# Patient Record
Sex: FEMALE | Race: BLACK OR AFRICAN AMERICAN | NOT HISPANIC OR LATINO | URBAN - METROPOLITAN AREA
[De-identification: names, ages, dates, MRNs, and addresses within clinical notes are randomized per-mention and may not be internally consistent; named-entity substitution may affect disease eponyms.]

---

## 2021-08-28 ENCOUNTER — TELEPHONE ENCOUNTER (OUTPATIENT)
Dept: URBAN - METROPOLITAN AREA CLINIC 13 | Facility: CLINIC | Age: 71
End: 2021-08-28

## 2021-08-29 ENCOUNTER — TELEPHONE ENCOUNTER (OUTPATIENT)
Dept: URBAN - METROPOLITAN AREA CLINIC 13 | Facility: CLINIC | Age: 71
End: 2021-08-29

## 2022-04-30 ENCOUNTER — TELEPHONE ENCOUNTER (OUTPATIENT)
Dept: URBAN - METROPOLITAN AREA CLINIC 121 | Facility: CLINIC | Age: 72
End: 2022-04-30

## 2022-05-01 ENCOUNTER — TELEPHONE ENCOUNTER (OUTPATIENT)
Dept: URBAN - METROPOLITAN AREA CLINIC 121 | Facility: CLINIC | Age: 72
End: 2022-05-01

## 2022-05-01 RX ORDER — CHLORPHENIRAMINE MALEATE, IBUPROFEN, AND PHENYLEPHRINE HYDROCHLORIDE 4; 200; 10 MG/1; MG/1; MG/1
TABLET, COATED ORAL
Status: ACTIVE | COMMUNITY
Start: 2011-06-29

## 2022-05-01 RX ORDER — OMEPRAZOLE 40 MG/1
1 PO BID CAPSULE, DELAYED RELEASE ORAL
Status: ACTIVE | COMMUNITY
Start: 2013-01-17

## 2022-06-04 ENCOUNTER — WEB ENCOUNTER (OUTPATIENT)
Dept: URBAN - METROPOLITAN AREA CLINIC 48 | Facility: CLINIC | Age: 72
End: 2022-06-04

## 2022-06-06 ENCOUNTER — CLAIMS CREATED FROM THE CLAIM WINDOW (OUTPATIENT)
Dept: URBAN - METROPOLITAN AREA CLINIC 48 | Facility: CLINIC | Age: 72
End: 2022-06-06
Payer: COMMERCIAL

## 2022-06-06 VITALS
DIASTOLIC BLOOD PRESSURE: 82 MMHG | HEIGHT: 67 IN | HEART RATE: 71 BPM | BODY MASS INDEX: 41.28 KG/M2 | TEMPERATURE: 97.7 F | SYSTOLIC BLOOD PRESSURE: 162 MMHG | WEIGHT: 263 LBS

## 2022-06-06 DIAGNOSIS — K21.9 ACID REFLUX: ICD-10-CM

## 2022-06-06 DIAGNOSIS — R10.13 DYSPEPSIA: ICD-10-CM

## 2022-06-06 DIAGNOSIS — R19.4 CHANGE IN BOWEL HABIT: ICD-10-CM

## 2022-06-06 DIAGNOSIS — K31.A13 INTESTINAL METAPLASIA OF FUNDUS OF STOMACH WITHOUT DYSPLASIA: ICD-10-CM

## 2022-06-06 PROCEDURE — 99204 OFFICE O/P NEW MOD 45 MIN: CPT | Performed by: INTERNAL MEDICINE

## 2022-06-06 RX ORDER — LISINOPRIL 10 MG/1
TABLET ORAL
Qty: 90 | Status: ACTIVE | COMMUNITY

## 2022-06-06 RX ORDER — SUCRALFATE 1 G/1
1 TABLET ON AN EMPTY STOMACH TABLET ORAL TWICE A DAY
Qty: 28 TABLET | Refills: 1 | OUTPATIENT
Start: 2022-06-06 | End: 2022-07-04

## 2022-06-06 RX ORDER — CHLORPHENIRAMINE MALEATE, IBUPROFEN, AND PHENYLEPHRINE HYDROCHLORIDE 4; 200; 10 MG/1; MG/1; MG/1
TABLET, COATED ORAL
Status: ON HOLD | COMMUNITY
Start: 2011-06-29

## 2022-06-06 RX ORDER — TRAMADOL HYDROCHLORIDE 50 MG/1
1 TABLET AS NEEDED TABLET, FILM COATED ORAL ONCE A DAY
Status: ACTIVE | COMMUNITY

## 2022-06-06 RX ORDER — OMEPRAZOLE 20 MG/1
1 PO BID CAPSULE, DELAYED RELEASE ORAL
OUTPATIENT
Start: 2013-01-17

## 2022-06-06 RX ORDER — FLUTICASONE FUROATE AND VILANTEROL TRIFENATATE 100; 25 UG/1; UG/1
POWDER RESPIRATORY (INHALATION)
Qty: 180 | Status: ACTIVE | COMMUNITY

## 2022-06-06 RX ORDER — OMEPRAZOLE 20 MG/1
1 PO BID CAPSULE, DELAYED RELEASE ORAL
Status: ACTIVE | COMMUNITY
Start: 2013-01-17

## 2022-06-06 NOTE — HPI-TODAY'S VISIT:
71 y/o female presents for evaluation of reflux, dyspepsia, and change in bowels. At the beginning of the year, she was put on Omeprazole 40 mg daily with incomplete relief in upper GI symptoms. She was treated for H. pylori infection in March/April of this year, and at that time, changed to Omeprazole 20 mg daily which she continues to take; repeat breath test 4/30/22 showed eradication. Labs from January of this year showed an unremarkable CBC and CMP. She has continued to have intermittent, central upper abdominal pain, bloating, belching, and indigestion. No N/V. Weight is stable. Denies NSAID use. She also c/o a change in bowels over the last several months with intermittnet constipation. She will have a few days in which bowels move regularly, and then will go several days between movements. She has used Miralax sporadically. No blood in stool, or known family h/o CRC or polyps. She does admit to intermittnet periumbilical and RLQ pain as well.   Her last EGD was done 2014 and showed a 1 cm hital hernia and gastric hyperplastic polyps; gastric bx showed reactive gastropathy with intestinal metaplasia. Last Colonoscopy was done 1/2015 with only internal hemorrhoids.

## 2022-06-09 ENCOUNTER — WEB ENCOUNTER (OUTPATIENT)
Dept: URBAN - METROPOLITAN AREA CLINIC 44 | Facility: CLINIC | Age: 72
End: 2022-06-09

## 2022-07-05 ENCOUNTER — WEB ENCOUNTER (OUTPATIENT)
Dept: URBAN - METROPOLITAN AREA CLINIC 44 | Facility: CLINIC | Age: 72
End: 2022-07-05

## 2022-07-12 ENCOUNTER — OFFICE VISIT (OUTPATIENT)
Dept: URBAN - METROPOLITAN AREA SURGERY CENTER 28 | Facility: SURGERY CENTER | Age: 72
End: 2022-07-12
Payer: COMMERCIAL

## 2022-07-12 DIAGNOSIS — K64.1 BLEEDING GRADE II HEMORRHOIDS: ICD-10-CM

## 2022-07-12 DIAGNOSIS — K30 ACID INDIGESTION: ICD-10-CM

## 2022-07-12 DIAGNOSIS — K44.9 DIAPHRAGMATIC HERNIA: ICD-10-CM

## 2022-07-12 DIAGNOSIS — R19.4 ALTERATION IN BOWEL ELIMINATION: ICD-10-CM

## 2022-07-12 PROCEDURE — G8907 PT DOC NO EVENTS ON DISCHARG: HCPCS | Performed by: INTERNAL MEDICINE

## 2022-07-12 PROCEDURE — 45378 DIAGNOSTIC COLONOSCOPY: CPT | Performed by: INTERNAL MEDICINE

## 2022-07-12 PROCEDURE — 43235 EGD DIAGNOSTIC BRUSH WASH: CPT | Performed by: INTERNAL MEDICINE

## 2022-07-12 RX ORDER — CHLORPHENIRAMINE MALEATE, IBUPROFEN, AND PHENYLEPHRINE HYDROCHLORIDE 4; 200; 10 MG/1; MG/1; MG/1
TABLET, COATED ORAL
Status: ON HOLD | COMMUNITY
Start: 2011-06-29

## 2022-07-12 RX ORDER — FLUTICASONE FUROATE AND VILANTEROL TRIFENATATE 100; 25 UG/1; UG/1
POWDER RESPIRATORY (INHALATION)
Qty: 180 | Status: ACTIVE | COMMUNITY

## 2022-07-12 RX ORDER — TRAMADOL HYDROCHLORIDE 50 MG/1
1 TABLET AS NEEDED TABLET, FILM COATED ORAL ONCE A DAY
Status: ACTIVE | COMMUNITY

## 2022-07-12 RX ORDER — LISINOPRIL 10 MG/1
TABLET ORAL
Qty: 90 | Status: ACTIVE | COMMUNITY

## 2022-07-12 RX ORDER — OMEPRAZOLE 20 MG/1
1 PO BID CAPSULE, DELAYED RELEASE ORAL
Status: ACTIVE | COMMUNITY
Start: 2013-01-17

## 2022-08-09 ENCOUNTER — OFFICE VISIT (OUTPATIENT)
Dept: URBAN - METROPOLITAN AREA CLINIC 48 | Facility: CLINIC | Age: 72
End: 2022-08-09

## 2022-08-29 ENCOUNTER — OFFICE VISIT (OUTPATIENT)
Dept: URBAN - METROPOLITAN AREA CLINIC 48 | Facility: CLINIC | Age: 72
End: 2022-08-29
Payer: COMMERCIAL

## 2022-08-29 VITALS
DIASTOLIC BLOOD PRESSURE: 78 MMHG | BODY MASS INDEX: 41.25 KG/M2 | HEART RATE: 57 BPM | TEMPERATURE: 97.7 F | SYSTOLIC BLOOD PRESSURE: 154 MMHG | HEIGHT: 67 IN | WEIGHT: 262.8 LBS

## 2022-08-29 DIAGNOSIS — R10.84 GENERALIZED ABDOMINAL PAIN: ICD-10-CM

## 2022-08-29 DIAGNOSIS — R10.13 DYSPEPSIA: ICD-10-CM

## 2022-08-29 DIAGNOSIS — K58.1 IRRITABLE BOWEL SYNDROME WITH CONSTIPATION: ICD-10-CM

## 2022-08-29 PROBLEM — 440630006: Status: ACTIVE | Noted: 2022-08-29

## 2022-08-29 PROCEDURE — 99214 OFFICE O/P EST MOD 30 MIN: CPT | Performed by: PHYSICIAN ASSISTANT

## 2022-08-29 RX ORDER — TRAMADOL HYDROCHLORIDE 50 MG/1
1 TABLET AS NEEDED TABLET, FILM COATED ORAL ONCE A DAY
Status: ACTIVE | COMMUNITY

## 2022-08-29 RX ORDER — FLUTICASONE FUROATE AND VILANTEROL TRIFENATATE 100; 25 UG/1; UG/1
POWDER RESPIRATORY (INHALATION)
Qty: 180 | Status: ACTIVE | COMMUNITY

## 2022-08-29 RX ORDER — OMEPRAZOLE 20 MG/1
1 PO BID CAPSULE, DELAYED RELEASE ORAL
Status: ACTIVE | COMMUNITY
Start: 2013-01-17

## 2022-08-29 RX ORDER — LISINOPRIL 10 MG/1
TABLET ORAL
Qty: 90 | Status: ACTIVE | COMMUNITY

## 2022-08-29 RX ORDER — OMEPRAZOLE 20 MG/1
1 PO BID CAPSULE, DELAYED RELEASE ORAL
OUTPATIENT
Start: 2013-01-17

## 2022-08-29 NOTE — HPI-TODAY'S VISIT:
73 y/o female presents for follow up on reflux, dyspepsia, and change in bowels. At the beginning of the year, she was put on Omeprazole 40 mg daily with incomplete relief in upper GI symptoms. She was treated for H. pylori infection in March/April of this year, and at that time, changed to Omeprazole 20 mg daily which she continues to take; repeat breath test 4/30/22 showed eradication. Labs from January of this year showed an unremarkable CBC and CMP. She was seen in June with continued c/o intermittent, central upper abdominal pain, bloating, belching, and indigestion. No N/V. Weight is stable. Denied NSAID use. She was to continue Omeprazole and also given a course of Sucralfate. EGD 7/12/22 showed a 1 cm hiatal hernia and otherwise normal. She also c/o a change in bowels over the last several months with intermittnet constipation. She will have a few days in which bowels move regularly, and then will go several days between movements. She was advised to increase dietary fiber and routine Miralax use. Colonoscopy 7/12/22 showed only internal hemorrhoids. She returns today and continues to have intermittent epigastric and RLQ pain which is rather bothersome on certain days. The RLQ pain sometimes radiates around to her flank/back. She is having bowel movents daily to every 3-4 days. No weight loss or new concerns. She continues to take 40 mg of Omeprazole daily. She admits to feeling full frequently; takes Tramadol PRN for HA.

## 2022-11-05 ENCOUNTER — WEB ENCOUNTER (OUTPATIENT)
Dept: URBAN - METROPOLITAN AREA CLINIC 48 | Facility: CLINIC | Age: 72
End: 2022-11-05

## 2022-11-09 ENCOUNTER — WEB ENCOUNTER (OUTPATIENT)
Dept: URBAN - METROPOLITAN AREA CLINIC 44 | Facility: CLINIC | Age: 72
End: 2022-11-09

## 2023-11-06 ENCOUNTER — OFFICE VISIT (OUTPATIENT)
Dept: URBAN - METROPOLITAN AREA CLINIC 48 | Facility: CLINIC | Age: 73
End: 2023-11-06
Payer: COMMERCIAL

## 2023-11-06 VITALS
TEMPERATURE: 98.1 F | HEART RATE: 52 BPM | BODY MASS INDEX: 41.4 KG/M2 | OXYGEN SATURATION: 97 % | HEIGHT: 67 IN | SYSTOLIC BLOOD PRESSURE: 145 MMHG | WEIGHT: 263.8 LBS | DIASTOLIC BLOOD PRESSURE: 79 MMHG

## 2023-11-06 DIAGNOSIS — R10.13 DYSPEPSIA: ICD-10-CM

## 2023-11-06 DIAGNOSIS — K58.1 IRRITABLE BOWEL SYNDROME WITH CONSTIPATION: ICD-10-CM

## 2023-11-06 PROCEDURE — 99213 OFFICE O/P EST LOW 20 MIN: CPT | Performed by: PHYSICIAN ASSISTANT

## 2023-11-06 RX ORDER — FLUTICASONE FUROATE AND VILANTEROL TRIFENATATE 100; 25 UG/1; UG/1
POWDER RESPIRATORY (INHALATION)
Qty: 180 | Status: ACTIVE | COMMUNITY

## 2023-11-06 RX ORDER — OMEPRAZOLE 20 MG/1
1 PO BID CAPSULE, DELAYED RELEASE ORAL
Status: ACTIVE | COMMUNITY
Start: 2013-01-17

## 2023-11-06 RX ORDER — LISINOPRIL 10 MG/1
1 TABLET TABLET ORAL ONCE A DAY
Qty: 90 TABLET | Status: ACTIVE | COMMUNITY

## 2023-11-06 RX ORDER — TRAMADOL HYDROCHLORIDE 50 MG/1
1 TABLET AS NEEDED TABLET, FILM COATED ORAL ONCE A DAY
Status: ACTIVE | COMMUNITY

## 2023-11-06 RX ORDER — NIFEDIPINE 30 MG/1
TABLET, EXTENDED RELEASE ORAL
Qty: 90 TABLET | Status: ACTIVE | COMMUNITY

## 2023-11-06 NOTE — HPI-TODAY'S VISIT:
74 y/o female presents for follow up on dyspepsia and constipation for which she was last seen in August of 2022. At the beginning 2022, she was put on Omeprazole 40 mg daily with incomplete relief in upper GI symptoms. She was treated for H. pylori infection in March/April 2022, and at that time, changed to Omeprazole 20 mg daily which she continues to take; repeat breath test 4/30/22 showed eradication. She was seen in June 2022 with continued c/o intermittent, central upper abdominal pain, bloating, belching, and indigestion. No N/V. Denied NSAID use. She was to continue Omeprazole and also given a course of Sucralfate. EGD 7/12/22 showed a 1 cm hiatal hernia and otherwise normal. At follow up today, 11/6/23, she states she does not have much reflux/indigestion, but does feel full after meals; still taking Omeprazole 20 mg daily to every other day. She takes Tramadol about once per week. She generally feels better when she is able to have a good bowel movement. Her main complaint today is continued constipation. She may have a bowel movement once per week if she takes nothing to help her to go. She has had incomplete relief with prunes which she has been taking the last few days and has had incomplete movements. She has failed Miralax, and had incomplete relief with Senna. IBSrela BID was too strong with diarrhea, and she never tried once daily dosing. She has abdominal fullness and excessive gas, but again, feels much better when she has a bowel movement. She reports a good diet with a lot of vegetables/fiber. Colonoscopy 7/12/22 showed only internal hemorrhoids. A CT abd/pelvis 9/14/22 showed no GI pathology.

## 2023-11-15 ENCOUNTER — WEB ENCOUNTER (OUTPATIENT)
Dept: URBAN - METROPOLITAN AREA CLINIC 44 | Facility: CLINIC | Age: 73
End: 2023-11-15

## 2024-02-12 ENCOUNTER — OV EP (OUTPATIENT)
Dept: URBAN - METROPOLITAN AREA CLINIC 48 | Facility: CLINIC | Age: 74
End: 2024-02-12
Payer: COMMERCIAL

## 2024-02-12 VITALS
TEMPERATURE: 97.7 F | BODY MASS INDEX: 42.41 KG/M2 | SYSTOLIC BLOOD PRESSURE: 149 MMHG | DIASTOLIC BLOOD PRESSURE: 69 MMHG | HEIGHT: 67 IN | OXYGEN SATURATION: 92 % | HEART RATE: 61 BPM | WEIGHT: 270.2 LBS

## 2024-02-12 DIAGNOSIS — R10.13 DYSPEPSIA: ICD-10-CM

## 2024-02-12 DIAGNOSIS — K58.1 IRRITABLE BOWEL SYNDROME WITH CONSTIPATION: ICD-10-CM

## 2024-02-12 PROCEDURE — 99213 OFFICE O/P EST LOW 20 MIN: CPT | Performed by: INTERNAL MEDICINE

## 2024-02-12 RX ORDER — OMEPRAZOLE 20 MG/1
1 PO BID CAPSULE, DELAYED RELEASE ORAL
Status: ACTIVE | COMMUNITY
Start: 2013-01-17

## 2024-02-12 RX ORDER — TRAMADOL HYDROCHLORIDE 50 MG/1
1 TABLET AS NEEDED TABLET, FILM COATED ORAL ONCE A DAY
Status: ACTIVE | COMMUNITY

## 2024-02-12 RX ORDER — FLUTICASONE FUROATE AND VILANTEROL TRIFENATATE 100; 25 UG/1; UG/1
POWDER RESPIRATORY (INHALATION)
Qty: 180 | Status: ACTIVE | COMMUNITY

## 2024-02-12 RX ORDER — LISINOPRIL 10 MG/1
1 TABLET TABLET ORAL ONCE A DAY
Qty: 90 TABLET | Status: ACTIVE | COMMUNITY

## 2024-02-12 RX ORDER — NIFEDIPINE 30 MG/1
1 TABLET TABLET, EXTENDED RELEASE ORAL ONCE A DAY
Qty: 90 TABLET | Status: ACTIVE | COMMUNITY

## 2024-02-12 NOTE — HPI-TODAY'S VISIT:
74 y/o female presents for follow up on dyspepsia and constipation for which she was last seen in August of 2022. At the beginning 2022, she was put on Omeprazole 40 mg daily with incomplete relief in upper GI symptoms. She was treated for H. pylori infection in March/April 2022, and at that time, changed to Omeprazole 20 mg daily which she continues to take; repeat breath test 4/30/22 showed eradication. She was seen in June 2022 with continued c/o intermittent, central upper abdominal pain, bloating, belching, and indigestion. No N/V. Denied NSAID use. She was to continue Omeprazole and also given a course of Sucralfate. EGD 7/12/22 showed a 1 cm hiatal hernia and otherwise normal.   11/6/23 for follow up: she states she does not have much reflux/indigestion, but does feel full after meals; still taking Omeprazole 20 mg daily to every other day. She takes Tramadol about once per week. She generally feels better when she is able to have a good bowel movement, and her main complaint was continued constipation. She may have a bowel movement once per week if she takes nothing to help her to go. She has had incomplete relief with prunes and Senna and has failed Miralax. She reports a good diet with a lot of vegetables/fiber. Colonoscopy 7/12/22 showed only internal hemorrhoids. A CT abd/pelvis 9/14/22 showed no GI pathology. She was given samples of IBSrela and Linzess 145 mcg.   2/12/24 for follow up: IBSrela once daily made her stomach "bubble up" but did not produce good bowel movements; BID dosing too strong. She did not try Linzess. Upper abdominal fullness is essentially resolved and she is using Omeprazole 20 mg PRN for indigestion. No new concerns.

## 2024-03-25 ENCOUNTER — OV EP (OUTPATIENT)
Dept: URBAN - METROPOLITAN AREA CLINIC 48 | Facility: CLINIC | Age: 74
End: 2024-03-25
Payer: COMMERCIAL

## 2024-03-25 VITALS
HEART RATE: 60 BPM | HEIGHT: 67 IN | TEMPERATURE: 97.6 F | SYSTOLIC BLOOD PRESSURE: 135 MMHG | BODY MASS INDEX: 40.82 KG/M2 | DIASTOLIC BLOOD PRESSURE: 63 MMHG | WEIGHT: 260.1 LBS

## 2024-03-25 DIAGNOSIS — R10.13 DYSPEPSIA: ICD-10-CM

## 2024-03-25 DIAGNOSIS — K58.1 IRRITABLE BOWEL SYNDROME WITH CONSTIPATION: ICD-10-CM

## 2024-03-25 PROCEDURE — 99213 OFFICE O/P EST LOW 20 MIN: CPT | Performed by: NURSE PRACTITIONER

## 2024-03-25 RX ORDER — FLUTICASONE FUROATE AND VILANTEROL TRIFENATATE 100; 25 UG/1; UG/1
POWDER RESPIRATORY (INHALATION)
Qty: 180 | Status: ACTIVE | COMMUNITY

## 2024-03-25 RX ORDER — LISINOPRIL 10 MG/1
1 TABLET TABLET ORAL ONCE A DAY
Qty: 90 TABLET | Status: ACTIVE | COMMUNITY

## 2024-03-25 RX ORDER — NIFEDIPINE 30 MG/1
1 TABLET TABLET, EXTENDED RELEASE ORAL ONCE A DAY
Qty: 90 TABLET | Status: ACTIVE | COMMUNITY

## 2024-03-25 RX ORDER — LINACLOTIDE 145 UG/1
1 CAPSULE AT LEAST 30 MINUTES BEFORE THE FIRST MEAL OF THE DAY ON AN EMPTY STOMACH CAPSULE, GELATIN COATED ORAL ONCE A DAY
Qty: 30 | Refills: 3 | Status: ACTIVE | COMMUNITY
Start: 2024-02-21 | End: 2024-06-20

## 2024-03-25 RX ORDER — OMEPRAZOLE 20 MG/1
1 PO BID CAPSULE, DELAYED RELEASE ORAL
Status: ACTIVE | COMMUNITY
Start: 2013-01-17

## 2024-03-25 RX ORDER — TRAMADOL HYDROCHLORIDE 50 MG/1
1 TABLET AS NEEDED TABLET, FILM COATED ORAL ONCE A DAY
Status: ACTIVE | COMMUNITY

## 2024-03-25 NOTE — HPI-TODAY'S VISIT:
73 y/o female presents for follow up on dyspepsia and constipation for which she was last seen in August of 2022. At the beginning 2022, she was put on Omeprazole 40 mg daily with incomplete relief in upper GI symptoms. She was treated for H. pylori infection in March/April 2022, and at that time, changed to Omeprazole 20 mg daily which she continues to take; repeat breath test 4/30/22 showed eradication. She was seen in June 2022 with continued c/o intermittent, central upper abdominal pain, bloating, belching, and indigestion. No N/V. Denied NSAID use. She was to continue Omeprazole and also given a course of Sucralfate. EGD 7/12/22 showed a 1 cm hiatal hernia and otherwise normal.   11/6/23 for follow up: she states she does not have much reflux/indigestion, but does feel full after meals; still taking Omeprazole 20 mg daily to every other day. She takes Tramadol about once per week. She generally feels better when she is able to have a good bowel movement, and her main complaint was continued constipation. She may have a bowel movement once per week if she takes nothing to help her to go. She has had incomplete relief with prunes and Senna and has failed Miralax. She reports a good diet with a lot of vegetables/fiber. Colonoscopy 7/12/22 showed only internal hemorrhoids. A CT abd/pelvis 9/14/22 showed no GI pathology. She was given samples of IBSrela and Linzess 145 mcg.   2/12/24 for follow up: IBSrela once daily made her stomach "bubble up" but did not produce good bowel movements; BID dosing too strong. She did not try Linzess. Upper abdominal fullness is essentially resolved and she is using Omeprazole 20 mg PRN for indigestion. No new concerns.  3/2024: Presents for follow up. She started Linzess 145 mcg. Bowel movements are every 1-2 day and pain is better. IBSrela did not help. She is not taking stool softeners. The patient has tried probiotics many years ago. She eats a hardin of fruit and vegetables. Last colon was in 2022 and showed internal hemorrhoids. GERD is controlled on Omeprazole 20mg prn.

## 2024-06-27 ENCOUNTER — ERX REFILL RESPONSE (OUTPATIENT)
Dept: URBAN - METROPOLITAN AREA CLINIC 44 | Facility: CLINIC | Age: 74
End: 2024-06-27

## 2024-06-27 RX ORDER — LINACLOTIDE 145 UG/1
TAKE ONE CAPSULE BY MOUTH ONE TIME DAILY ON AN EMPTY STOMACH AT LEAST 30 MINUTES BEFORE THE FIRST MEAL CAPSULE, GELATIN COATED ORAL
Qty: 30 CAPSULE | Refills: 4 | OUTPATIENT

## 2024-06-27 RX ORDER — LINACLOTIDE 145 UG/1
TAKE ONE CAPSULE BY MOUTH ONE TIME DAILY ON AN EMPTY STOMACH AT LEAST 30 MINUTES BEFORE THE FIRST MEAL CAPSULE, GELATIN COATED ORAL
Qty: 30 CAPSULE | Refills: 3 | OUTPATIENT

## 2024-07-18 ENCOUNTER — OFFICE VISIT (OUTPATIENT)
Dept: URBAN - METROPOLITAN AREA CLINIC 48 | Facility: CLINIC | Age: 74
End: 2024-07-18

## 2024-08-14 ENCOUNTER — OFFICE VISIT (OUTPATIENT)
Dept: URBAN - METROPOLITAN AREA CLINIC 48 | Facility: CLINIC | Age: 74
End: 2024-08-14
Payer: MEDICARE

## 2024-08-14 ENCOUNTER — DASHBOARD ENCOUNTERS (OUTPATIENT)
Age: 74
End: 2024-08-14

## 2024-08-14 VITALS
BODY MASS INDEX: 40.02 KG/M2 | HEART RATE: 60 BPM | DIASTOLIC BLOOD PRESSURE: 75 MMHG | TEMPERATURE: 97.7 F | HEIGHT: 67 IN | WEIGHT: 255 LBS | SYSTOLIC BLOOD PRESSURE: 159 MMHG

## 2024-08-14 DIAGNOSIS — K58.1 IRRITABLE BOWEL SYNDROME WITH CONSTIPATION: ICD-10-CM

## 2024-08-14 PROCEDURE — 99213 OFFICE O/P EST LOW 20 MIN: CPT | Performed by: NURSE PRACTITIONER

## 2024-08-14 RX ORDER — FLUTICASONE FUROATE AND VILANTEROL TRIFENATATE 100; 25 UG/1; UG/1
POWDER RESPIRATORY (INHALATION)
Qty: 180 | Status: ACTIVE | COMMUNITY

## 2024-08-14 RX ORDER — LINACLOTIDE 145 UG/1
TAKE ONE CAPSULE BY MOUTH ONE TIME DAILY ON AN EMPTY STOMACH AT LEAST 30 MINUTES BEFORE THE FIRST MEAL CAPSULE, GELATIN COATED ORAL
Qty: 30 CAPSULE | Refills: 3 | Status: ACTIVE | COMMUNITY

## 2024-08-14 RX ORDER — TRAMADOL HYDROCHLORIDE 50 MG/1
1 TABLET AS NEEDED TABLET, FILM COATED ORAL ONCE A DAY
Status: ACTIVE | COMMUNITY

## 2024-08-14 RX ORDER — LISINOPRIL 10 MG/1
1 TABLET TABLET ORAL ONCE A DAY
Qty: 90 TABLET | Status: ACTIVE | COMMUNITY

## 2024-08-14 RX ORDER — OMEPRAZOLE 20 MG/1
1 PO BID CAPSULE, DELAYED RELEASE ORAL
Status: ACTIVE | COMMUNITY
Start: 2013-01-17

## 2024-08-14 NOTE — HPI-TODAY'S VISIT:
73 y/o female presents for follow up on dyspepsia and constipation for which she was last seen in August of 2022. At the beginning 2022, she was put on Omeprazole 40 mg daily with incomplete relief in upper GI symptoms. She was treated for H. pylori infection in March/April 2022, and at that time, changed to Omeprazole 20 mg daily which she continues to take; repeat breath test 4/30/22 showed eradication. She was seen in June 2022 with continued c/o intermittent, central upper abdominal pain, bloating, belching, and indigestion. No N/V. Denied NSAID use. She was to continue Omeprazole and also given a course of Sucralfate. EGD 7/12/22 showed a 1 cm hiatal hernia and otherwise normal.   11/6/23 for follow up: she states she does not have much reflux/indigestion, but does feel full after meals; still taking Omeprazole 20 mg daily to every other day. She takes Tramadol about once per week. She generally feels better when she is able to have a good bowel movement, and her main complaint was continued constipation. She may have a bowel movement once per week if she takes nothing to help her to go. She has had incomplete relief with prunes and Senna and has failed Miralax. She reports a good diet with a lot of vegetables/fiber. Colonoscopy 7/12/22 showed only internal hemorrhoids. A CT abd/pelvis 9/14/22 showed no GI pathology. She was given samples of IBSrela and Linzess 145 mcg.   2/12/24 for follow up: IBSrela once daily made her stomach "bubble up" but did not produce good bowel movements; BID dosing too strong. She did not try Linzess. Upper abdominal fullness is essentially resolved and she is using Omeprazole 20 mg PRN for indigestion. No new concerns.  3/2024: Presents for follow up. She started Linzess 145 mcg. Bowel movements are every 1-2 day and pain is better. IBSrela did not help. She is not taking stool softeners. The patient has tried probiotics many years ago. She eats a lot of fruit and vegetables. Last colon was in 2022 and showed internal hemorrhoids. GERD is controlled on Omeprazole 20mg prn.  8/14/24: Today, presents for follow up of constipation. Currently, she is taking a daily probiotic, Senna prn, and Linzess 145mcg 2 times a week but feels she still has to strain. Yesterday, she felt very constipated and took Miralax and Dulcolax. At times, she has abdominal cramping associated with bowel movements. Denies blood in the stool. GERD has been under good control and she has not had to use Omeprzole.

## 2024-11-14 ENCOUNTER — OFFICE VISIT (OUTPATIENT)
Dept: URBAN - METROPOLITAN AREA CLINIC 48 | Facility: CLINIC | Age: 74
End: 2024-11-14
Payer: MEDICARE

## 2024-11-14 VITALS
HEART RATE: 58 BPM | TEMPERATURE: 97.8 F | HEIGHT: 67 IN | BODY MASS INDEX: 41.69 KG/M2 | WEIGHT: 265.6 LBS | SYSTOLIC BLOOD PRESSURE: 163 MMHG | DIASTOLIC BLOOD PRESSURE: 71 MMHG

## 2024-11-14 DIAGNOSIS — K58.1 IRRITABLE BOWEL SYNDROME WITH CONSTIPATION: ICD-10-CM

## 2024-11-14 DIAGNOSIS — K59.01 SLOW TRANSIT CONSTIPATION: ICD-10-CM

## 2024-11-14 DIAGNOSIS — K64.8 INTERNAL HEMORRHOIDS: ICD-10-CM

## 2024-11-14 PROBLEM — 90458007: Status: ACTIVE | Noted: 2024-11-14

## 2024-11-14 PROBLEM — 35298007: Status: ACTIVE | Noted: 2024-11-14

## 2024-11-14 PROBLEM — 72519002: Status: ACTIVE | Noted: 2024-11-14

## 2024-11-14 PROCEDURE — 99213 OFFICE O/P EST LOW 20 MIN: CPT | Performed by: NURSE PRACTITIONER

## 2024-11-14 RX ORDER — LINACLOTIDE 290 UG/1
1 CAPSULE AT LEAST 30 MINUTES BEFORE THE FIRST MEAL OF THE DAY ON AN EMPTY STOMACH CAPSULE, GELATIN COATED ORAL ONCE A DAY
Qty: 30 | Refills: 6 | OUTPATIENT
Start: 2024-11-14 | End: 2025-06-12

## 2024-11-14 RX ORDER — LINACLOTIDE 145 UG/1
1 CAPSULE AT LEAST 30 MINUTES BEFORE THE FIRST MEAL OF THE DAY ON AN EMPTY STOMACH CAPSULE, GELATIN COATED ORAL ONCE A DAY
Refills: 3 | Status: ACTIVE | COMMUNITY

## 2024-11-14 RX ORDER — FLUTICASONE FUROATE AND VILANTEROL TRIFENATATE 100; 25 UG/1; UG/1
POWDER RESPIRATORY (INHALATION)
Qty: 180 | Status: ACTIVE | COMMUNITY

## 2024-11-14 RX ORDER — LISINOPRIL 10 MG/1
1 TABLET TABLET ORAL TWICE A DAY
Qty: 180 TABLET | Status: ACTIVE | COMMUNITY

## 2024-11-14 RX ORDER — TRAMADOL HYDROCHLORIDE 50 MG/1
1 TABLET AS NEEDED TABLET, FILM COATED ORAL ONCE A DAY
Status: ACTIVE | COMMUNITY

## 2024-11-14 RX ORDER — HYDROCORTISONE 25 MG/G
1 APPLICATION CREAM TOPICAL TWICE A DAY
Qty: 1 | Refills: 1 | OUTPATIENT
Start: 2024-11-14 | End: 2024-11-20

## 2024-11-14 RX ORDER — OMEPRAZOLE 20 MG/1
1 PO BID CAPSULE, DELAYED RELEASE ORAL
Status: ACTIVE | COMMUNITY
Start: 2013-01-17

## 2024-11-14 NOTE — HPI-TODAY'S VISIT:
73 y/o female presents for follow up on dyspepsia and constipation for which she was last seen in August of 2022. At the beginning 2022, she was put on Omeprazole 40 mg daily with incomplete relief in upper GI symptoms. She was treated for H. pylori infection in March/April 2022, and at that time, changed to Omeprazole 20 mg daily which she continues to take; repeat breath test 4/30/22 showed eradication. She was seen in June 2022 with continued c/o intermittent, central upper abdominal pain, bloating, belching, and indigestion. No N/V. Denied NSAID use. She was to continue Omeprazole and also given a course of Sucralfate. EGD 7/12/22 showed a 1 cm hiatal hernia and otherwise normal.   11/6/23 for follow up: she states she does not have much reflux/indigestion, but does feel full after meals; still taking Omeprazole 20 mg daily to every other day. She takes Tramadol about once per week. She generally feels better when she is able to have a good bowel movement, and her main complaint was continued constipation. She may have a bowel movement once per week if she takes nothing to help her to go. She has had incomplete relief with prunes and Senna and has failed Miralax. She reports a good diet with a lot of vegetables/fiber. Colonoscopy 7/12/22 showed only internal hemorrhoids. A CT abd/pelvis 9/14/22 showed no GI pathology. She was given samples of IBSrela and Linzess 145 mcg.   2/12/24 for follow up: IBSrela once daily made her stomach "bubble up" but did not produce good bowel movements; BID dosing too strong. She did not try Linzess. Upper abdominal fullness is essentially resolved and she is using Omeprazole 20 mg PRN for indigestion. No new concerns.  3/2024: Presents for follow up. She started Linzess 145 mcg. Bowel movements are every 1-2 day and pain is better. IBSrela did not help. She is not taking stool softeners. The patient has tried probiotics many years ago. She eats a lot of fruit and vegetables. Last colon was in 2022 and showed internal hemorrhoids. GERD is controlled on Omeprazole 20mg prn.  8/14/24: Today, presents for follow up of constipation. Currently, she is taking a daily probiotic, Senna prn, and Linzess 145mcg 2 times a week but feels she still has to strain. Yesterday, she felt very constipated and took Miralax and Dulcolax. At times, she has abdominal cramping associated with bowel movements. Denies blood in the stool. GERD has been under good control and she has not had to use Omeprzole.  11/14/24: Presents for follow up of constipation and bloating. The patient did not have much relief from probiotics. She feels if she takes Linzess 290mcg and Senekot she has a good bowel movement but other times feels it is too much. The patient feels bloating and borborgymi that is relieved after a bowel movement. Denies blood in the stool. GERD is mostly under control but if she has reflux, she takes a Omeprazole. The patient relays she often worries something is wrong with her body. We had a long discussion reviewing her EGD, colonoscopy, and CT from 2022.

## 2025-03-26 ENCOUNTER — OFFICE VISIT (OUTPATIENT)
Dept: URBAN - METROPOLITAN AREA CLINIC 48 | Facility: CLINIC | Age: 75
End: 2025-03-26
Payer: MEDICARE

## 2025-03-26 ENCOUNTER — LAB OUTSIDE AN ENCOUNTER (OUTPATIENT)
Dept: URBAN - METROPOLITAN AREA CLINIC 48 | Facility: CLINIC | Age: 75
End: 2025-03-26

## 2025-03-26 DIAGNOSIS — K58.1 IRRITABLE BOWEL SYNDROME WITH CONSTIPATION: ICD-10-CM

## 2025-03-26 DIAGNOSIS — R14.0 ABDOMINAL BLOATING: ICD-10-CM

## 2025-03-26 DIAGNOSIS — K21.9 GASTROESOPHAGEAL REFLUX DISEASE WITHOUT ESOPHAGITIS: ICD-10-CM

## 2025-03-26 DIAGNOSIS — R68.81 EARLY SATIETY: ICD-10-CM

## 2025-03-26 DIAGNOSIS — R10.84 GENERALIZED ABDOMINAL PAIN: ICD-10-CM

## 2025-03-26 DIAGNOSIS — R10.13 DYSPEPSIA: ICD-10-CM

## 2025-03-26 PROBLEM — 162031009: Status: ACTIVE | Noted: 2025-03-26

## 2025-03-26 PROBLEM — 102614006: Status: ACTIVE | Noted: 2025-03-26

## 2025-03-26 PROBLEM — 266435005: Status: ACTIVE | Noted: 2025-03-26

## 2025-03-26 PROBLEM — 442076002: Status: ACTIVE | Noted: 2025-03-26

## 2025-03-26 PROCEDURE — 99214 OFFICE O/P EST MOD 30 MIN: CPT | Performed by: NURSE PRACTITIONER

## 2025-03-26 RX ORDER — OMEPRAZOLE 20 MG/1
1 PO BID CAPSULE, DELAYED RELEASE ORAL
Status: ACTIVE | COMMUNITY
Start: 2013-01-17

## 2025-03-26 RX ORDER — LISINOPRIL 10 MG/1
1 TABLET TABLET ORAL TWICE A DAY
Qty: 180 TABLET | Status: ACTIVE | COMMUNITY

## 2025-03-26 RX ORDER — LINACLOTIDE 145 UG/1
1 CAPSULE AT LEAST 30 MINUTES BEFORE THE FIRST MEAL OF THE DAY ON AN EMPTY STOMACH CAPSULE, GELATIN COATED ORAL ONCE A DAY
Refills: 3 | Status: ACTIVE | COMMUNITY

## 2025-03-26 RX ORDER — TRAMADOL HYDROCHLORIDE 50 MG/1
1 TABLET AS NEEDED TABLET, FILM COATED ORAL ONCE A DAY
Status: ACTIVE | COMMUNITY

## 2025-03-26 RX ORDER — FLUTICASONE FUROATE AND VILANTEROL TRIFENATATE 100; 25 UG/1; UG/1
POWDER RESPIRATORY (INHALATION)
Qty: 180 | Status: ACTIVE | COMMUNITY

## 2025-03-26 RX ORDER — LINACLOTIDE 290 UG/1
1 CAPSULE AT LEAST 30 MINUTES BEFORE THE FIRST MEAL OF THE DAY ON AN EMPTY STOMACH CAPSULE, GELATIN COATED ORAL ONCE A DAY
Qty: 30 | Refills: 6 | Status: ACTIVE | COMMUNITY
Start: 2024-11-14 | End: 2025-06-12

## 2025-03-26 NOTE — HPI-TODAY'S VISIT:
75 y/o female presents for follow up on dyspepsia and constipation for which she was last seen in August of 2022. At the beginning 2022, she was put on Omeprazole 40 mg daily with incomplete relief in upper GI symptoms. She was treated for H. pylori infection in March/April 2022, and at that time, changed to Omeprazole 20 mg daily which she continues to take; repeat breath test 4/30/22 showed eradication. She was seen in June 2022 with continued c/o intermittent, central upper abdominal pain, bloating, belching, and indigestion. No N/V. Denied NSAID use. She was to continue Omeprazole and also given a course of Sucralfate. EGD 7/12/22 showed a 1 cm hiatal hernia and otherwise normal.   11/6/23 for follow up: she states she does not have much reflux/indigestion, but does feel full after meals; still taking Omeprazole 20 mg daily to every other day. She takes Tramadol about once per week. She generally feels better when she is able to have a good bowel movement, and her main complaint was continued constipation. She may have a bowel movement once per week if she takes nothing to help her to go. She has had incomplete relief with prunes and Senna and has failed Miralax. She reports a good diet with a lot of vegetables/fiber. Colonoscopy 7/12/22 showed only internal hemorrhoids. A CT abd/pelvis 9/14/22 showed no GI pathology. She was given samples of IBSrela and Linzess 145 mcg.   2/12/24 for follow up: IBSrela once daily made her stomach "bubble up" but did not produce good bowel movements; BID dosing too strong. She did not try Linzess. Upper abdominal fullness is essentially resolved and she is using Omeprazole 20 mg PRN for indigestion. No new concerns.  3/2024: Presents for follow up. She started Linzess 145 mcg. Bowel movements are every 1-2 day and pain is better. IBSrela did not help. She is not taking stool softeners. The patient has tried probiotics many years ago. She eats a lot of fruit and vegetables. Last colon was in 2022 and showed internal hemorrhoids. GERD is controlled on Omeprazole 20mg prn.  8/14/24: Today, presents for follow up of constipation. Currently, she is taking a daily probiotic, Senna prn, and Linzess 145mcg 2 times a week but feels she still has to strain. Yesterday, she felt very constipated and took Miralax and Dulcolax. At times, she has abdominal cramping associated with bowel movements. Denies blood in the stool. GERD has been under good control and she has not had to use Omeprzole.  11/14/24: Presents for follow up of constipation and bloating. The patient did not have much relief from probiotics. She feels if she takes Linzess 290mcg and Senekot she has a good bowel movement but other times feels it is too much. The patient feels bloating and borborgymi that is relieved after a bowel movement. Denies blood in the stool. GERD is mostly under control but if she has reflux, she takes a Omeprazole. The patient relays she often worries something is wrong with her body. We had a long discussion reviewing her EGD, colonoscopy, and CT from 2022.  3/26/25: The patient presents for follow up. Continues to have dyspepsia along with early satiety and some mild abdominal discomfort. Bloating and fullness occur and often are worse when she is constipated. In the past, she has had constipation for which she has tried IBSrela and Linzess 145/290 mcg. Now she also supplements with prune juice and prunes along with Senekot prn. Generalized abdominal discomfort is worse with constipation. Denies blood in the stool. The patient feels anxious about her health. She is prediabetic and has lost some weight.

## 2025-04-06 ENCOUNTER — WEB ENCOUNTER (OUTPATIENT)
Dept: URBAN - METROPOLITAN AREA CLINIC 44 | Facility: CLINIC | Age: 75
End: 2025-04-06

## 2025-04-30 ENCOUNTER — OFFICE VISIT (OUTPATIENT)
Dept: URBAN - METROPOLITAN AREA CLINIC 48 | Facility: CLINIC | Age: 75
End: 2025-04-30

## 2025-05-07 ENCOUNTER — OFFICE VISIT (OUTPATIENT)
Dept: URBAN - METROPOLITAN AREA CLINIC 48 | Facility: CLINIC | Age: 75
End: 2025-05-07

## 2025-05-13 ENCOUNTER — LAB OUTSIDE AN ENCOUNTER (OUTPATIENT)
Dept: URBAN - METROPOLITAN AREA CLINIC 48 | Facility: CLINIC | Age: 75
End: 2025-05-13

## 2025-05-13 ENCOUNTER — OFFICE VISIT (OUTPATIENT)
Dept: URBAN - METROPOLITAN AREA CLINIC 48 | Facility: CLINIC | Age: 75
End: 2025-05-13
Payer: MEDICARE

## 2025-05-13 DIAGNOSIS — R10.13 DYSPEPSIA: ICD-10-CM

## 2025-05-13 DIAGNOSIS — K58.1 IRRITABLE BOWEL SYNDROME WITH CONSTIPATION: ICD-10-CM

## 2025-05-13 DIAGNOSIS — R10.84 GENERALIZED ABDOMINAL PAIN: ICD-10-CM

## 2025-05-13 DIAGNOSIS — R68.81 EARLY SATIETY: ICD-10-CM

## 2025-05-13 DIAGNOSIS — R53.82 CHRONIC FATIGUE: ICD-10-CM

## 2025-05-13 PROBLEM — 84229001: Status: ACTIVE | Noted: 2025-05-13

## 2025-05-13 PROCEDURE — 99214 OFFICE O/P EST MOD 30 MIN: CPT | Performed by: NURSE PRACTITIONER

## 2025-05-13 RX ORDER — LINACLOTIDE 145 UG/1
1 CAPSULE AT LEAST 30 MINUTES BEFORE THE FIRST MEAL OF THE DAY ON AN EMPTY STOMACH CAPSULE, GELATIN COATED ORAL ONCE A DAY
Refills: 3 | Status: ACTIVE | COMMUNITY

## 2025-05-13 RX ORDER — OMEPRAZOLE 20 MG/1
1 PO BID CAPSULE, DELAYED RELEASE ORAL
Status: ACTIVE | COMMUNITY
Start: 2013-01-17

## 2025-05-13 RX ORDER — SULFAMETHOXAZOLE AND TRIMETHOPRIM 800; 160 MG/1; MG/1
1 TABLET TABLET ORAL TWICE A DAY
Qty: 20 TABLET | Refills: 0 | OUTPATIENT
Start: 2025-05-13 | End: 2025-05-23

## 2025-05-13 RX ORDER — TRAMADOL HYDROCHLORIDE 50 MG/1
1 TABLET AS NEEDED TABLET, FILM COATED ORAL ONCE A DAY
Status: ACTIVE | COMMUNITY

## 2025-05-13 RX ORDER — LISINOPRIL 10 MG/1
1 TABLET TABLET ORAL TWICE A DAY
Qty: 180 TABLET | Status: ACTIVE | COMMUNITY

## 2025-05-13 RX ORDER — FLUTICASONE FUROATE AND VILANTEROL TRIFENATATE 100; 25 UG/1; UG/1
POWDER RESPIRATORY (INHALATION)
Qty: 180 | Status: ACTIVE | COMMUNITY

## 2025-05-13 NOTE — HPI-TODAY'S VISIT:
76 y/o female presents for follow up on dyspepsia and constipation. At the beginning 2022, she was put on Omeprazole 40 mg daily with incomplete relief in upper GI symptoms. She was treated for H. pylori infection in March/April 2022, and at that time, changed to Omeprazole 20 mg daily which she continues to take; repeat breath test 4/30/22 showed eradication. She was seen in June 2022 with continued c/o intermittent, central upper abdominal pain, bloating, belching, and indigestion. No N/V. Denied NSAID use. She was to continue Omeprazole and also given a course of Sucralfate. EGD 7/12/22 showed a 1 cm hiatal hernia and otherwise normal.   11/6/23 for follow up: she states she does not have much reflux/indigestion, but does feel full after meals; still taking Omeprazole 20 mg daily to every other day. She takes Tramadol about once per week. She generally feels better when she is able to have a good bowel movement, and her main complaint was continued constipation. She may have a bowel movement once per week if she takes nothing to help her to go. She has had incomplete relief with prunes and Senna and has failed Miralax. She reports a good diet with a lot of vegetables/fiber. Colonoscopy 7/12/22 showed only internal hemorrhoids. A CT abd/pelvis 9/14/22 showed no GI pathology. She was given samples of IBSrela and Linzess 145 mcg.   2/12/24 for follow up: IBSrela once daily made her stomach "bubble up" but did not produce good bowel movements; BID dosing too strong. She did not try Linzess. Upper abdominal fullness is essentially resolved and she is using Omeprazole 20 mg PRN for indigestion. No new concerns.  3/2024: Presents for follow up. She started Linzess 145 mcg. Bowel movements are every 1-2 day and pain is better. IBSrela did not help. She is not taking stool softeners. The patient has tried probiotics many years ago. She eats a lot of fruit and vegetables. Last colon was in 2022 and showed internal hemorrhoids. GERD is controlled on Omeprazole 20mg prn.  8/14/24: Today, presents for follow up of constipation. Currently, she is taking a daily probiotic, Senna prn, and Linzess 145mcg 2 times a week but feels she still has to strain. Yesterday, she felt very constipated and took Miralax and Dulcolax. At times, she has abdominal cramping associated with bowel movements. Denies blood in the stool. GERD has been under good control and she has not had to use Omeprzole.  11/14/24: Presents for follow up of constipation and bloating. The patient did not have much relief from probiotics. She feels if she takes Linzess 290mcg and Senekot she has a good bowel movement but other times feels it is too much. The patient feels bloating and borborgymi that is relieved after a bowel movement. Denies blood in the stool. GERD is mostly under control but if she has reflux, she takes a Omeprazole. The patient relays she often worries something is wrong with her body. We had a long discussion reviewing her EGD, colonoscopy, and CT from 2022.  3/26/25: The patient presents for follow up. Continues to have dyspepsia along with early satiety and some mild abdominal discomfort. Bloating and fullness occur and often are worse when she is constipated. In the past, she has had constipation for which she has tried IBSrela and Linzess 145/290 mcg. Now she also supplements with prune juice and prunes along with Senekot prn. Generalized abdominal discomfort is worse with constipation. Denies blood in the stool. The patient feels anxious about her health. She is prediabetic and has lost some weight.  5/13/25: Presents for follow up. She is haivng continued symptoms. The patient is having a stress test tomorrow morning. Gastric empty study 4/2025 was normal. On May 5, she had chest discomfort, bloating, and discomort. She took Linzess that morning and Colace at night without bm. Next morning she took Linzess and still no bm at which time she took Dulcolax. After she started having a bm, she felt some relief. Her family called 911 and she is in the midst of cardiac work up. The patient continues to have belching. Currently she is taking Linzess 145mcg and Colace daily. She is not taking Omeprazole 20mg daily. no

## 2025-05-14 ENCOUNTER — OFFICE VISIT (OUTPATIENT)
Dept: URBAN - METROPOLITAN AREA CLINIC 48 | Facility: CLINIC | Age: 75
End: 2025-05-14

## 2025-05-24 ENCOUNTER — WEB ENCOUNTER (OUTPATIENT)
Dept: URBAN - METROPOLITAN AREA CLINIC 44 | Facility: CLINIC | Age: 75
End: 2025-05-24

## 2025-05-27 ENCOUNTER — LAB OUTSIDE AN ENCOUNTER (OUTPATIENT)
Dept: URBAN - METROPOLITAN AREA CLINIC 44 | Facility: CLINIC | Age: 75
End: 2025-05-27

## 2025-05-27 PROBLEM — 79922009: Status: ACTIVE | Noted: 2025-05-27

## 2025-05-27 PROBLEM — 302215000: Status: ACTIVE | Noted: 2025-05-27

## 2025-06-20 ENCOUNTER — WEB ENCOUNTER (OUTPATIENT)
Dept: URBAN - METROPOLITAN AREA CLINIC 44 | Facility: CLINIC | Age: 75
End: 2025-06-20

## 2025-06-20 RX ORDER — TENAPANOR HYDROCHLORIDE 53.2 MG/1
1 TABLET IMMEDIATELY BEFORE MEALS TABLET ORAL TWICE A DAY
Qty: 60 | Refills: 11 | OUTPATIENT
Start: 2025-06-20

## 2025-06-23 ENCOUNTER — TELEPHONE ENCOUNTER (OUTPATIENT)
Dept: URBAN - METROPOLITAN AREA CLINIC 48 | Facility: CLINIC | Age: 75
End: 2025-06-23

## 2025-06-26 ENCOUNTER — OFFICE VISIT (OUTPATIENT)
Dept: URBAN - METROPOLITAN AREA CLINIC 48 | Facility: CLINIC | Age: 75
End: 2025-06-26

## 2025-06-26 ENCOUNTER — TELEPHONE ENCOUNTER (OUTPATIENT)
Dept: URBAN - METROPOLITAN AREA CLINIC 48 | Facility: CLINIC | Age: 75
End: 2025-06-26

## 2025-08-03 ENCOUNTER — WEB ENCOUNTER (OUTPATIENT)
Dept: URBAN - METROPOLITAN AREA CLINIC 44 | Facility: CLINIC | Age: 75
End: 2025-08-03

## 2025-08-04 ENCOUNTER — WEB ENCOUNTER (OUTPATIENT)
Dept: URBAN - METROPOLITAN AREA CLINIC 44 | Facility: CLINIC | Age: 75
End: 2025-08-04

## 2025-08-22 ENCOUNTER — OFFICE VISIT (OUTPATIENT)
Dept: URBAN - METROPOLITAN AREA SURGERY CENTER 27 | Facility: SURGERY CENTER | Age: 75
End: 2025-08-22